# Patient Record
Sex: FEMALE | Race: WHITE | NOT HISPANIC OR LATINO | URBAN - METROPOLITAN AREA
[De-identification: names, ages, dates, MRNs, and addresses within clinical notes are randomized per-mention and may not be internally consistent; named-entity substitution may affect disease eponyms.]

---

## 2021-08-18 ENCOUNTER — LAB OUTSIDE AN ENCOUNTER (OUTPATIENT)
Dept: URBAN - METROPOLITAN AREA CLINIC 94 | Facility: CLINIC | Age: 71
End: 2021-08-18

## 2021-08-18 ENCOUNTER — OFFICE VISIT (OUTPATIENT)
Dept: URBAN - METROPOLITAN AREA CLINIC 94 | Facility: CLINIC | Age: 71
End: 2021-08-18
Payer: MEDICARE

## 2021-08-18 ENCOUNTER — WEB ENCOUNTER (OUTPATIENT)
Dept: URBAN - METROPOLITAN AREA CLINIC 94 | Facility: CLINIC | Age: 71
End: 2021-08-18

## 2021-08-18 VITALS
WEIGHT: 153 LBS | BODY MASS INDEX: 25.49 KG/M2 | SYSTOLIC BLOOD PRESSURE: 141 MMHG | HEART RATE: 84 BPM | DIASTOLIC BLOOD PRESSURE: 79 MMHG | HEIGHT: 65 IN | TEMPERATURE: 97.7 F

## 2021-08-18 DIAGNOSIS — R19.7 DIARRHEA, UNSPECIFIED TYPE: ICD-10-CM

## 2021-08-18 PROCEDURE — 99213 OFFICE O/P EST LOW 20 MIN: CPT | Performed by: INTERNAL MEDICINE

## 2021-08-18 RX ORDER — OMEPRAZOLE 20 MG/1
TAKE 1 TABLET BY ORAL ROUTE AS NEEDED TABLET, DELAYED RELEASE ORAL EVERY OTHER DAY
Refills: 0 | Status: ACTIVE | COMMUNITY
Start: 1900-01-01

## 2021-08-18 RX ORDER — MELATONIN/PYRIDOXINE HCL (B6) 5 MG-10 MG
TAKE 4 CAPSULES BY ORAL ROUTE DAILY TABLET,IMMED, EXTENDED RELEASE, BIPHASIC ORAL 1
Qty: 0 | Refills: 0 | Status: ACTIVE | COMMUNITY
Start: 1900-01-01

## 2021-08-18 RX ORDER — GABAPENTIN 600 MG/1
1 TABLET TABLET, FILM COATED ORAL ONCE A DAY
Refills: 0 | Status: ACTIVE | COMMUNITY
Start: 1900-01-01

## 2021-08-18 RX ORDER — ATORVASTATIN CALCIUM 10 MG/1
TAKE 1 TABLET (10 MG) BY ORAL ROUTE ONCE DAILY TABLET, FILM COATED ORAL 1
Qty: 0 | Refills: 0 | Status: ACTIVE | COMMUNITY
Start: 1900-01-01

## 2021-08-18 RX ORDER — ASPIRIN 81 MG/1
TAKE 1 TABLET (81 MG) BY ORAL ROUTE ONCE DAILY TABLET, COATED ORAL 1
Qty: 0 | Refills: 0 | Status: ACTIVE | COMMUNITY
Start: 1900-01-01

## 2021-08-18 RX ORDER — CALCIUM CARBONATE/VITAMIN D3 600 MG-10
TAKE 1 TABLET BY ORAL ROUTE 2 TIMES A DAY TABLET ORAL 2
Qty: 0 | Refills: 0 | Status: ACTIVE | COMMUNITY
Start: 1900-01-01

## 2021-08-18 RX ORDER — CHOLESTYRAMINE 4 G/9G
1 SCOOP POWDER, FOR SUSPENSION ORAL TWICE A DAY
Qty: 1 | Refills: 3 | OUTPATIENT

## 2021-08-18 RX ORDER — FLUTICASONE PROPIONATE 50 UG/1
INHALE 1 SPRAY BY NASAL ROUTE 2 TIMES A DAY SPRAY, METERED NASAL 2
Qty: 1 | Refills: 0 | Status: ACTIVE | COMMUNITY
Start: 1900-01-01

## 2021-08-18 NOTE — HPI-TODAY'S VISIT:
Pt had colonoscopy for diarrhea in 11/2019 which showed diverticulosis and hemorrhoids. Random colon biopsies were WNL. Pt continues to have diarrhea. Average 3-4 semiliquid to semisolid stools daily. C/O bloating and gas. Denies rectal bleeding or weight loss.

## 2021-08-22 LAB
A/G RATIO: 1.9
ALBUMIN: 5
ALKALINE PHOSPHATASE: 174
ALT (SGPT): 23
AST (SGOT): 20
BASO (ABSOLUTE): 0.1
BASOS: 1
BILIRUBIN, TOTAL: 0.8
BUN/CREATININE RATIO: 22
BUN: 18
C-REACTIVE PROTEIN, QUANT: 6
CALCIUM: 9.9
CARBON DIOXIDE, TOTAL: 24
CHLORIDE: 102
CREATININE: 0.82
EGFR IF AFRICN AM: 84
EGFR IF NONAFRICN AM: 73
ENDOMYSIAL ANTIBODY IGA: NEGATIVE
EOS (ABSOLUTE): 0.2
EOS: 2
F001-IGE EGG WHITE: <0.1
F002-IGE MILK: <0.1
F003-IGE CODFISH: <0.1
F004-IGE WHEAT: <0.1
F008-IGE CORN: <0.1
F010-IGE SESAME SEED: <0.1
F013-IGE PEANUT: <0.1
F014-IGE SOYBEAN: <0.1
F024-IGE SHRIMP: <0.1
F207-IGE CLAM: <0.1
F256-IGE WALNUT: <0.1
F338-IGE SCALLOP: <0.1
GLOBULIN, TOTAL: 2.7
GLUCOSE: 71
HEMATOCRIT: 42.6
HEMATOLOGY COMMENTS:: (no result)
HEMOGLOBIN: 14.1
IMMATURE CELLS: (no result)
IMMATURE GRANS (ABS): 0
IMMATURE GRANULOCYTES: 1
IMMUNOGLOBULIN A, QN, SERUM: 183
LYMPHS (ABSOLUTE): 1.6
LYMPHS: 22
Lab: (no result)
MCH: 28.6
MCHC: 33.1
MCV: 86
MONOCYTES(ABSOLUTE): 0.7
MONOCYTES: 9
NEUTROPHILS (ABSOLUTE): 4.8
NEUTROPHILS: 65
NRBC: (no result)
PLATELETS: 310
POTASSIUM: 4.4
PROTEIN, TOTAL: 7.7
RBC: 4.93
RDW: 13.1
SODIUM: 140
T-TRANSGLUTAMINASE (TTG) IGA: <2
TSH: 2.22
WBC: 7.3

## 2021-08-23 ENCOUNTER — TELEPHONE ENCOUNTER (OUTPATIENT)
Dept: URBAN - METROPOLITAN AREA CLINIC 94 | Facility: CLINIC | Age: 71
End: 2021-08-23

## 2021-08-27 ENCOUNTER — LAB OUTSIDE AN ENCOUNTER (OUTPATIENT)
Dept: URBAN - METROPOLITAN AREA CLINIC 94 | Facility: CLINIC | Age: 71
End: 2021-08-27

## 2021-09-02 LAB
CALPROTECTIN, STOOL - QDX: (no result)
PANCREATICELASTASE ELISA, STOOL: (no result)

## 2021-10-20 ENCOUNTER — LAB OUTSIDE AN ENCOUNTER (OUTPATIENT)
Dept: URBAN - METROPOLITAN AREA CLINIC 94 | Facility: CLINIC | Age: 71
End: 2021-10-20

## 2021-10-20 ENCOUNTER — OFFICE VISIT (OUTPATIENT)
Dept: URBAN - METROPOLITAN AREA CLINIC 94 | Facility: CLINIC | Age: 71
End: 2021-10-20
Payer: MEDICARE

## 2021-10-20 VITALS
SYSTOLIC BLOOD PRESSURE: 134 MMHG | HEART RATE: 87 BPM | HEIGHT: 65 IN | BODY MASS INDEX: 25.49 KG/M2 | TEMPERATURE: 97.9 F | DIASTOLIC BLOOD PRESSURE: 82 MMHG | WEIGHT: 153 LBS

## 2021-10-20 DIAGNOSIS — R14.0 BLOATING: ICD-10-CM

## 2021-10-20 DIAGNOSIS — R19.7 DIARRHEA, UNSPECIFIED TYPE: ICD-10-CM

## 2021-10-20 DIAGNOSIS — R74.8 ELEVATED ALKALINE PHOSPHATASE LEVEL: ICD-10-CM

## 2021-10-20 PROCEDURE — 99214 OFFICE O/P EST MOD 30 MIN: CPT | Performed by: INTERNAL MEDICINE

## 2021-10-20 RX ORDER — FLUTICASONE PROPIONATE 50 UG/1
INHALE 1 SPRAY BY NASAL ROUTE 2 TIMES A DAY SPRAY, METERED NASAL 2
Qty: 1 | Refills: 0 | Status: ACTIVE | COMMUNITY
Start: 1900-01-01

## 2021-10-20 RX ORDER — GABAPENTIN 600 MG/1
1 TABLET TABLET, FILM COATED ORAL ONCE A DAY
Refills: 0 | Status: ACTIVE | COMMUNITY
Start: 1900-01-01

## 2021-10-20 RX ORDER — CHOLESTYRAMINE 4 G/9G
1 SCOOP POWDER, FOR SUSPENSION ORAL TWICE A DAY
Qty: 1 | Refills: 3 | Status: ON HOLD | COMMUNITY

## 2021-10-20 RX ORDER — ASPIRIN 81 MG/1
TAKE 1 TABLET (81 MG) BY ORAL ROUTE ONCE DAILY TABLET, COATED ORAL 1
Qty: 0 | Refills: 0 | Status: ACTIVE | COMMUNITY
Start: 1900-01-01

## 2021-10-20 RX ORDER — CALCIUM CARBONATE/VITAMIN D3 600 MG-10
TAKE 1 TABLET BY ORAL ROUTE 2 TIMES A DAY TABLET ORAL 2
Qty: 0 | Refills: 0 | Status: ACTIVE | COMMUNITY
Start: 1900-01-01

## 2021-10-20 RX ORDER — MELATONIN/PYRIDOXINE HCL (B6) 5 MG-10 MG
TAKE 4 CAPSULES BY ORAL ROUTE DAILY TABLET,IMMED, EXTENDED RELEASE, BIPHASIC ORAL 1
Qty: 0 | Refills: 0 | Status: ACTIVE | COMMUNITY
Start: 1900-01-01

## 2021-10-20 RX ORDER — ATORVASTATIN CALCIUM 10 MG/1
TAKE 1 TABLET (10 MG) BY ORAL ROUTE ONCE DAILY TABLET, FILM COATED ORAL 1
Qty: 0 | Refills: 0 | Status: ACTIVE | COMMUNITY
Start: 1900-01-01

## 2021-10-20 RX ORDER — OMEPRAZOLE 20 MG/1
TAKE 1 TABLET BY ORAL ROUTE AS NEEDED TABLET, DELAYED RELEASE ORAL EVERY OTHER DAY
Refills: 0 | Status: ACTIVE | COMMUNITY
Start: 1900-01-01

## 2021-10-20 NOTE — HPI-TODAY'S VISIT:
Pt recently evaluated for diarrhea Colonoscopy 2019 showed diverticulosis and random colon biopsies were normal. Recent labs showed normal fecal calprotectin and elastase. Bloodwork showed slightly elevated alkphos at 174. Remaining LFTs WNL. CBC, celiac panel, TSH, CRP all WNL Pt c/o excessive gas and bloating all the time. Diarrhea has improved with dietary modifications. Pt states that she has a history of elevated alkaline phosphatase for > 10 years and had extensive w/u for this in [place] which was reporteldy all negative, as per pt.

## 2021-10-25 LAB
A/G RATIO: 1.8
ALBUMIN: 4.8
ALKALINE PHOSPHATASE: 134
ALT (SGPT): 25
AST (SGOT): 18
BILIRUBIN, TOTAL: 0.9
BONE FRACTION:: 27
BUN/CREATININE RATIO: 30
BUN: 24
CALCIUM: 10.2
CARBON DIOXIDE, TOTAL: 23
CHLORIDE: 100
CREATININE: 0.81
EGFR IF AFRICN AM: 85
EGFR IF NONAFRICN AM: 74
GLOBULIN, TOTAL: 2.7
GLUCOSE: 97
INTESTINAL FRAC.:: 4
LIVER FRACTION:: 69
POTASSIUM: 4
PROTEIN, TOTAL: 7.5
SODIUM: 137

## 2021-10-26 ENCOUNTER — TELEPHONE ENCOUNTER (OUTPATIENT)
Dept: URBAN - METROPOLITAN AREA CLINIC 94 | Facility: CLINIC | Age: 71
End: 2021-10-26

## 2021-11-08 ENCOUNTER — TELEPHONE ENCOUNTER (OUTPATIENT)
Dept: URBAN - METROPOLITAN AREA CLINIC 92 | Facility: CLINIC | Age: 71
End: 2021-11-08

## 2021-11-08 ENCOUNTER — TELEPHONE ENCOUNTER (OUTPATIENT)
Dept: URBAN - METROPOLITAN AREA CLINIC 94 | Facility: CLINIC | Age: 71
End: 2021-11-08

## 2021-11-08 RX ORDER — RIFAXIMIN 550 MG/1
1 TABLET TABLET ORAL THREE TIMES A DAY
Qty: 42 TABLET | Refills: 0 | OUTPATIENT
Start: 2021-11-08 | End: 2021-11-22

## 2021-11-08 RX ORDER — RIFAXIMIN 550 MG/1
1 TABLET TABLET ORAL THREE TIMES A DAY
Qty: 42 TABLET | Refills: 0
Start: 2021-11-08 | End: 2021-11-22

## 2022-01-03 ENCOUNTER — LAB OUTSIDE AN ENCOUNTER (OUTPATIENT)
Dept: URBAN - METROPOLITAN AREA CLINIC 94 | Facility: CLINIC | Age: 72
End: 2022-01-03

## 2022-01-03 ENCOUNTER — OFFICE VISIT (OUTPATIENT)
Dept: URBAN - METROPOLITAN AREA CLINIC 94 | Facility: CLINIC | Age: 72
End: 2022-01-03
Payer: MEDICARE

## 2022-01-03 VITALS
HEIGHT: 65 IN | DIASTOLIC BLOOD PRESSURE: 81 MMHG | BODY MASS INDEX: 24.66 KG/M2 | WEIGHT: 148 LBS | SYSTOLIC BLOOD PRESSURE: 143 MMHG | HEART RATE: 79 BPM | TEMPERATURE: 97.3 F

## 2022-01-03 DIAGNOSIS — R14.0 ABDOMINAL BLOATING: ICD-10-CM

## 2022-01-03 DIAGNOSIS — R19.7 DIARRHEA, UNSPECIFIED TYPE: ICD-10-CM

## 2022-01-03 PROCEDURE — 99214 OFFICE O/P EST MOD 30 MIN: CPT | Performed by: INTERNAL MEDICINE

## 2022-01-03 RX ORDER — OMEPRAZOLE 20 MG/1
TAKE 1 TABLET BY ORAL ROUTE AS NEEDED TABLET, DELAYED RELEASE ORAL EVERY OTHER DAY
Refills: 0 | Status: ACTIVE | COMMUNITY
Start: 1900-01-01

## 2022-01-03 RX ORDER — ATORVASTATIN CALCIUM 10 MG/1
TAKE 1 TABLET (10 MG) BY ORAL ROUTE ONCE DAILY TABLET, FILM COATED ORAL 1
Qty: 0 | Refills: 0 | Status: ACTIVE | COMMUNITY
Start: 1900-01-01

## 2022-01-03 RX ORDER — CHOLESTYRAMINE 4 G/9G
1 SCOOP POWDER, FOR SUSPENSION ORAL TWICE A DAY
Qty: 1 | Refills: 3 | Status: ON HOLD | COMMUNITY

## 2022-01-03 RX ORDER — FLUTICASONE PROPIONATE 50 UG/1
INHALE 1 SPRAY BY NASAL ROUTE 2 TIMES A DAY SPRAY, METERED NASAL 2
Qty: 1 | Refills: 0 | Status: ACTIVE | COMMUNITY
Start: 1900-01-01

## 2022-01-03 RX ORDER — GABAPENTIN 600 MG/1
1 TABLET TABLET, FILM COATED ORAL ONCE A DAY
Refills: 0 | Status: ACTIVE | COMMUNITY
Start: 1900-01-01

## 2022-01-03 RX ORDER — DICYCLOMINE HYDROCHLORIDE 10 MG/1
1 TABLET CAPSULE ORAL THREE TIMES A DAY
Qty: 90 TABLET | Refills: 3 | OUTPATIENT
Start: 2022-01-03 | End: 2022-05-03

## 2022-01-03 RX ORDER — MELATONIN/PYRIDOXINE HCL (B6) 5 MG-10 MG
TAKE 4 CAPSULES BY ORAL ROUTE DAILY TABLET,IMMED, EXTENDED RELEASE, BIPHASIC ORAL 1
Qty: 0 | Refills: 0 | Status: ACTIVE | COMMUNITY
Start: 1900-01-01

## 2022-01-03 RX ORDER — CALCIUM CARBONATE/VITAMIN D3 600 MG-10
TAKE 1 TABLET BY ORAL ROUTE 2 TIMES A DAY TABLET ORAL 2
Qty: 0 | Refills: 0 | Status: ACTIVE | COMMUNITY
Start: 1900-01-01

## 2022-01-03 RX ORDER — ASPIRIN 81 MG/1
TAKE 1 TABLET (81 MG) BY ORAL ROUTE ONCE DAILY TABLET, COATED ORAL 1
Qty: 0 | Refills: 0 | Status: ACTIVE | COMMUNITY
Start: 1900-01-01

## 2022-01-03 NOTE — HPI-TODAY'S VISIT:
Pt recently evaluated for bloating, gas and  diarrhea Colonoscopy 2019 showed diverticulosis and random colon biopsies were normal. Recent labs showed normal fecal calprotectin and elastase. Bloodwork showed slightly elevated alkphos at 174. Remaining LFTs WNL. CBC, celiac panel, TSH, CRP all WNL Pt c/o excessive gas and bloating all the time. HBT: POSITIVE FOR SIBO. Pt treated with xifaxan 550 mg TID x 14 days for SIBO. Pt has not noted a significant improvement after taking xifaxan for SIBO. She continues to have bloating and gas with intermittent diarrhea.

## 2022-01-14 ENCOUNTER — ERX REFILL RESPONSE (OUTPATIENT)
Dept: URBAN - METROPOLITAN AREA CLINIC 52 | Facility: CLINIC | Age: 72
End: 2022-01-14

## 2022-01-14 ENCOUNTER — TELEPHONE ENCOUNTER (OUTPATIENT)
Dept: URBAN - METROPOLITAN AREA CLINIC 92 | Facility: CLINIC | Age: 72
End: 2022-01-14

## 2022-01-14 RX ORDER — CIPROFLOXACIN 500 MG/1
1 TABLET TABLET, FILM COATED ORAL
Qty: 20 | OUTPATIENT
Start: 2022-01-14 | End: 2022-01-24

## 2022-01-14 RX ORDER — METRONIDAZOLE 500 MG/1
1 TABLET TABLET, FILM COATED ORAL
Qty: 20 | Refills: 1 | OUTPATIENT

## 2022-01-14 RX ORDER — CIPROFLOXACIN 500 MG/1
1 TABLET TABLET, FILM COATED ORAL
Qty: 20 | OUTPATIENT

## 2022-01-14 RX ORDER — LEVOFLOXACIN 500 MG
1 TABLET TABLET ORAL ONCE A DAY
Qty: 10 | Refills: 0 | OUTPATIENT

## 2022-01-19 ENCOUNTER — TELEPHONE ENCOUNTER (OUTPATIENT)
Dept: URBAN - METROPOLITAN AREA CLINIC 94 | Facility: CLINIC | Age: 72
End: 2022-01-19

## 2022-01-26 ENCOUNTER — OFFICE VISIT (OUTPATIENT)
Dept: URBAN - METROPOLITAN AREA CLINIC 94 | Facility: CLINIC | Age: 72
End: 2022-01-26

## 2022-01-26 ENCOUNTER — ERX REFILL RESPONSE (OUTPATIENT)
Dept: URBAN - METROPOLITAN AREA CLINIC 52 | Facility: CLINIC | Age: 72
End: 2022-01-26

## 2022-01-26 RX ORDER — DICYCLOMINE HYDROCHLORIDE 10 MG/1
TAKE 1 CAPSULE BY MOUTH THREE TIMES A DAY FOR 30 DAYS CAPSULE ORAL
Qty: 90 CAPSULE | Refills: 4 | OUTPATIENT

## 2022-01-26 RX ORDER — DICYCLOMINE HYDROCHLORIDE 10 MG/1
1 TABLET CAPSULE ORAL THREE TIMES A DAY
Qty: 90 TABLET | Refills: 3 | OUTPATIENT

## 2022-03-28 ENCOUNTER — TELEPHONE ENCOUNTER (OUTPATIENT)
Dept: URBAN - METROPOLITAN AREA CLINIC 94 | Facility: CLINIC | Age: 72
End: 2022-03-28

## 2022-03-28 RX ORDER — DICYCLOMINE HYDROCHLORIDE 10 MG/1
TAKE 1 CAPSULE BY MOUTH THREE TIMES A DAY FOR 30 DAYS CAPSULE ORAL
Qty: 90 CAPSULE | Refills: 4

## 2022-04-07 ENCOUNTER — WEB ENCOUNTER (OUTPATIENT)
Dept: URBAN - METROPOLITAN AREA CLINIC 94 | Facility: CLINIC | Age: 72
End: 2022-04-07

## 2022-04-11 ENCOUNTER — OFFICE VISIT (OUTPATIENT)
Dept: URBAN - METROPOLITAN AREA CLINIC 94 | Facility: CLINIC | Age: 72
End: 2022-04-11

## 2022-04-13 ENCOUNTER — OFFICE VISIT (OUTPATIENT)
Dept: URBAN - METROPOLITAN AREA CLINIC 94 | Facility: CLINIC | Age: 72
End: 2022-04-13
Payer: MEDICARE

## 2022-04-13 VITALS
SYSTOLIC BLOOD PRESSURE: 140 MMHG | TEMPERATURE: 97.7 F | DIASTOLIC BLOOD PRESSURE: 79 MMHG | HEIGHT: 65 IN | WEIGHT: 146 LBS | HEART RATE: 82 BPM | BODY MASS INDEX: 24.32 KG/M2

## 2022-04-13 DIAGNOSIS — K63.89 SMALL INTESTINAL BACTERIAL OVERGROWTH (SIBO): ICD-10-CM

## 2022-04-13 DIAGNOSIS — R74.8 ELEVATED ALKALINE PHOSPHATASE LEVEL: ICD-10-CM

## 2022-04-13 PROCEDURE — 99213 OFFICE O/P EST LOW 20 MIN: CPT | Performed by: PHYSICIAN ASSISTANT

## 2022-04-13 RX ORDER — METRONIDAZOLE 500 MG/1
1 TABLET TABLET, FILM COATED ORAL
Qty: 20 | Refills: 1 | Status: ON HOLD | COMMUNITY

## 2022-04-13 RX ORDER — CALCIUM CARBONATE/VITAMIN D3 600 MG-10
TAKE 1 TABLET BY ORAL ROUTE 2 TIMES A DAY TABLET ORAL 2
Qty: 0 | Refills: 0 | Status: ACTIVE | COMMUNITY
Start: 1900-01-01

## 2022-04-13 RX ORDER — MELATONIN/PYRIDOXINE HCL (B6) 5 MG-10 MG
TAKE 4 CAPSULES BY ORAL ROUTE DAILY TABLET,IMMED, EXTENDED RELEASE, BIPHASIC ORAL 1
Qty: 0 | Refills: 0 | Status: ACTIVE | COMMUNITY
Start: 1900-01-01

## 2022-04-13 RX ORDER — CHOLESTYRAMINE 4 G/9G
1 SCOOP POWDER, FOR SUSPENSION ORAL TWICE A DAY
Qty: 1 | Refills: 3 | Status: ON HOLD | COMMUNITY

## 2022-04-13 RX ORDER — ATORVASTATIN CALCIUM 10 MG/1
TAKE 1 TABLET (10 MG) BY ORAL ROUTE ONCE DAILY TABLET, FILM COATED ORAL 1
Qty: 0 | Refills: 0 | Status: ACTIVE | COMMUNITY
Start: 1900-01-01

## 2022-04-13 RX ORDER — LEVOFLOXACIN 500 MG
1 TABLET TABLET ORAL ONCE A DAY
Qty: 10 | Refills: 0 | Status: ON HOLD | COMMUNITY

## 2022-04-13 RX ORDER — OMEPRAZOLE 20 MG/1
TAKE 1 TABLET BY ORAL ROUTE AS NEEDED TABLET, DELAYED RELEASE ORAL EVERY OTHER DAY
Refills: 0 | Status: ON HOLD | COMMUNITY
Start: 1900-01-01

## 2022-04-13 RX ORDER — FLUTICASONE PROPIONATE 50 UG/1
INHALE 1 SPRAY BY NASAL ROUTE 2 TIMES A DAY SPRAY, METERED NASAL 2
Qty: 1 | Refills: 0 | Status: ACTIVE | COMMUNITY
Start: 1900-01-01

## 2022-04-13 RX ORDER — GABAPENTIN 600 MG/1
AS DIRECTED TABLET, FILM COATED ORAL BID
Refills: 0 | Status: ACTIVE | COMMUNITY
Start: 1900-01-01

## 2022-04-13 RX ORDER — DICYCLOMINE HYDROCHLORIDE 10 MG/1
AS DIRECTED CAPSULE ORAL THREE TIMES A DAY
Refills: 4 | Status: ACTIVE | COMMUNITY

## 2022-04-13 RX ORDER — ASPIRIN 81 MG/1
TAKE 1 TABLET (81 MG) BY ORAL ROUTE ONCE DAILY TABLET, COATED ORAL 1
Qty: 0 | Refills: 0 | Status: ON HOLD | COMMUNITY
Start: 1900-01-01

## 2022-04-13 NOTE — HPI-TODAY'S VISIT:
Since her last office visit, patient reports Dicyclomine has really offered improvement. Pt reports soft stools with urgency but better than previous. She denies diarrhea and reports having 1-2 soft stools/day . She denies melena or hematochezia. Pt is aware that sugar seems to be a trigger. She continues on a low fod map diet.    Colonoscopy 2019 showed diverticulosis and random colon biopsies were normal. Recent labs showed normal fecal calprotectin and elastase. Bloodwork showed slightly elevated alkphos at 174. Remaining LFTs WNL. CBC, celiac panel, TSH, CRP all WNL Pt c/o excessive gas and bloating all the time. HBT: POSITIVE FOR SIBO. Pt treated with xifaxan 550 mg TID x 14 days for SIBO. Pt has not noted a significant improvement after taking xifaxan for SIBO. She continues to have bloating and gas with intermittent diarrhea. Pt also tx with Cipro/Flagyl without improvement.

## 2022-07-10 ENCOUNTER — ERX REFILL RESPONSE (OUTPATIENT)
Dept: URBAN - METROPOLITAN AREA CLINIC 52 | Facility: CLINIC | Age: 72
End: 2022-07-10

## 2022-07-10 RX ORDER — DICYCLOMINE HYDROCHLORIDE 10 MG/1
AS DIRECTED CAPSULE ORAL THREE TIMES A DAY
Refills: 4 | OUTPATIENT

## 2022-07-10 RX ORDER — DICYCLOMINE HYDROCHLORIDE 10 MG/1
AS DIRECTED CAPSULE ORAL THREE TIMES A DAY
Qty: 90 | Refills: 4 | OUTPATIENT

## 2022-10-16 ENCOUNTER — ERX REFILL RESPONSE (OUTPATIENT)
Dept: URBAN - METROPOLITAN AREA CLINIC 52 | Facility: CLINIC | Age: 72
End: 2022-10-16

## 2022-10-16 RX ORDER — DICYCLOMINE HYDROCHLORIDE 10 MG/1
AS DIRECTED CAPSULE ORAL THREE TIMES A DAY
Qty: 90 | Refills: 4 | OUTPATIENT

## 2022-10-16 RX ORDER — DICYCLOMINE HYDROCHLORIDE 10 MG/1
TAKE 1 CAPSULE BY MOUTH THREE TIMES A DAY CAPSULE ORAL
Qty: 270 CAPSULE | Refills: 1 | OUTPATIENT

## 2022-10-22 ENCOUNTER — P2P PATIENT RECORD (OUTPATIENT)
Age: 72
End: 2022-10-22

## 2023-02-14 ENCOUNTER — OFFICE VISIT (OUTPATIENT)
Dept: URBAN - METROPOLITAN AREA CLINIC 94 | Facility: CLINIC | Age: 73
End: 2023-02-14
Payer: MEDICARE

## 2023-02-14 VITALS
DIASTOLIC BLOOD PRESSURE: 77 MMHG | TEMPERATURE: 97.7 F | HEIGHT: 65 IN | WEIGHT: 146 LBS | SYSTOLIC BLOOD PRESSURE: 138 MMHG | BODY MASS INDEX: 24.32 KG/M2 | HEART RATE: 84 BPM | OXYGEN SATURATION: 94 %

## 2023-02-14 DIAGNOSIS — K58.0 IRRITABLE BOWEL SYNDROME WITH DIARRHEA: ICD-10-CM

## 2023-02-14 DIAGNOSIS — K63.89 SMALL INTESTINAL BACTERIAL OVERGROWTH (SIBO): ICD-10-CM

## 2023-02-14 PROBLEM — 197125005: Status: ACTIVE | Noted: 2023-02-14

## 2023-02-14 PROCEDURE — 99213 OFFICE O/P EST LOW 20 MIN: CPT | Performed by: PHYSICIAN ASSISTANT

## 2023-02-14 RX ORDER — GABAPENTIN 600 MG/1
AS DIRECTED TABLET, FILM COATED ORAL BID
Refills: 0 | Status: ACTIVE | COMMUNITY
Start: 1900-01-01

## 2023-02-14 RX ORDER — FLUTICASONE PROPIONATE 50 UG/1
INHALE 1 SPRAY BY NASAL ROUTE 2 TIMES A DAY SPRAY, METERED NASAL 2
Qty: 1 | Refills: 0 | Status: ACTIVE | COMMUNITY
Start: 1900-01-01

## 2023-02-14 RX ORDER — DICYCLOMINE HYDROCHLORIDE 10 MG/1
TAKE 1 CAPSULE CAPSULE ORAL THREE TIMES A DAY
Qty: 270 | Refills: 3

## 2023-02-14 RX ORDER — MELATONIN/PYRIDOXINE HCL (B6) 5 MG-10 MG
TAKE 4 CAPSULES BY ORAL ROUTE DAILY TABLET,IMMED, EXTENDED RELEASE, BIPHASIC ORAL 1
Qty: 0 | Refills: 0 | Status: ACTIVE | COMMUNITY
Start: 1900-01-01

## 2023-02-14 RX ORDER — DENOSUMAB 60 MG/ML
AS DIRECTED INJECTION SUBCUTANEOUS
Status: ACTIVE | COMMUNITY

## 2023-02-14 RX ORDER — CALCIUM CARBONATE/VITAMIN D3 600 MG-10
TAKE 1 TABLET BY ORAL ROUTE 2 TIMES A DAY TABLET ORAL 2
Qty: 0 | Refills: 0 | Status: ACTIVE | COMMUNITY
Start: 1900-01-01

## 2023-02-14 RX ORDER — ATORVASTATIN CALCIUM 10 MG/1
TAKE 1 TABLET (10 MG) BY ORAL ROUTE ONCE DAILY TABLET, FILM COATED ORAL 1
Qty: 0 | Refills: 0 | Status: ACTIVE | COMMUNITY
Start: 1900-01-01

## 2023-02-14 RX ORDER — DICYCLOMINE HYDROCHLORIDE 10 MG/1
TAKE 1 CAPSULE BY MOUTH THREE TIMES A DAY CAPSULE ORAL
Qty: 270 CAPSULE | Refills: 1 | Status: ACTIVE | COMMUNITY

## 2023-02-14 NOTE — PHYSICAL EXAM CHEST:
no lesions,  no deformities,  no traumatic injuries,  no significant scars are present,  chest wall non-tender,  no masses present, breathing is unlabored without accessory muscle use,normal breath sounds Regular Diet - No restrictions

## 2023-02-14 NOTE — HPI-TODAY'S VISIT:
73 yo F evaluated today for 1 yr f/u visit for hx of IBS-D.  Since her last office visit, patient reports Dicyclomine has really offered improvement. Pt takes 10 mg TID. She denies abdominal pain and may have diarrhea ~ 6 mos. Pt reports soft stools with urgency but better than previous. She may have 1-2 soft stools/day . She denies melena or hematochezia. Pt is aware that sugar seems to be a trigger. She continues on a low fod map diet and avoids triggers.   Colonoscopy 2019 showed diverticulosis and random colon biopsies were normal. Surveillance colonoscopu in 10 yrs.  Recent labs showed normal fecal calprotectin and elastase. Bloodwork showed slightly elevated alkphos at 174. Remaining LFTs WNL. CBC, celiac panel, TSH, CRP all WNL Pt c/o excessive gas and bloating all the time. HBT: POSITIVE FOR SIBO. Pt treated with xifaxan 550 mg TID x 14 days for SIBO.

## 2023-02-17 ENCOUNTER — TELEPHONE ENCOUNTER (OUTPATIENT)
Dept: URBAN - METROPOLITAN AREA CLINIC 94 | Facility: CLINIC | Age: 73
End: 2023-02-17

## 2023-02-22 ENCOUNTER — TELEPHONE ENCOUNTER (OUTPATIENT)
Dept: URBAN - METROPOLITAN AREA CLINIC 94 | Facility: CLINIC | Age: 73
End: 2023-02-22

## 2023-02-22 RX ORDER — DICYCLOMINE HYDROCHLORIDE 10 MG/1
TAKE 1 CAPSULE CAPSULE ORAL THREE TIMES A DAY
Qty: 270 | Refills: 3

## 2024-02-06 ENCOUNTER — OV EP (OUTPATIENT)
Dept: URBAN - METROPOLITAN AREA CLINIC 94 | Facility: CLINIC | Age: 74
End: 2024-02-06
Payer: MEDICARE

## 2024-02-06 VITALS
OXYGEN SATURATION: 92 % | DIASTOLIC BLOOD PRESSURE: 79 MMHG | BODY MASS INDEX: 24.83 KG/M2 | SYSTOLIC BLOOD PRESSURE: 148 MMHG | HEIGHT: 65 IN | WEIGHT: 149 LBS | TEMPERATURE: 97.5 F | HEART RATE: 93 BPM

## 2024-02-06 DIAGNOSIS — K63.89 SMALL INTESTINAL BACTERIAL OVERGROWTH (SIBO): ICD-10-CM

## 2024-02-06 DIAGNOSIS — K58.0 IRRITABLE BOWEL SYNDROME WITH DIARRHEA: ICD-10-CM

## 2024-02-06 PROCEDURE — 99213 OFFICE O/P EST LOW 20 MIN: CPT | Performed by: PHYSICIAN ASSISTANT

## 2024-02-06 RX ORDER — CALCIUM CARBONATE/VITAMIN D3 600 MG-10
TAKE 1 TABLET BY ORAL ROUTE 2 TIMES A DAY TABLET ORAL 2
Qty: 0 | Refills: 0 | Status: ACTIVE | COMMUNITY
Start: 1900-01-01

## 2024-02-06 RX ORDER — DENOSUMAB 60 MG/ML
AS DIRECTED INJECTION SUBCUTANEOUS
Status: ACTIVE | COMMUNITY

## 2024-02-06 RX ORDER — ATORVASTATIN CALCIUM 10 MG/1
TAKE 1 TABLET (10 MG) BY ORAL ROUTE ONCE DAILY TABLET, FILM COATED ORAL 1
Qty: 0 | Refills: 0 | Status: ACTIVE | COMMUNITY
Start: 1900-01-01

## 2024-02-06 RX ORDER — DICYCLOMINE HYDROCHLORIDE 10 MG/1
TAKE 1 CAPSULE CAPSULE ORAL THREE TIMES A DAY
Qty: 270 | Refills: 3 | Status: ACTIVE | COMMUNITY

## 2024-02-06 RX ORDER — MELATONIN/PYRIDOXINE HCL (B6) 5 MG-10 MG
TAKE 4 CAPSULES BY ORAL ROUTE DAILY TABLET,IMMED, EXTENDED RELEASE, BIPHASIC ORAL 1
Qty: 0 | Refills: 0 | Status: ACTIVE | COMMUNITY
Start: 1900-01-01

## 2024-02-06 RX ORDER — DICYCLOMINE HYDROCHLORIDE 10 MG/1
1 CAPSULE 30 MINUTES BEFORE EATING CAPSULE ORAL THREE TIMES A DAY
Qty: 270 | Refills: 3

## 2024-02-06 RX ORDER — FLUTICASONE PROPIONATE 50 UG/1
INHALE 1 SPRAY BY NASAL ROUTE 2 TIMES A DAY SPRAY, METERED NASAL 2
Qty: 1 | Refills: 0 | Status: ACTIVE | COMMUNITY
Start: 1900-01-01

## 2024-02-06 RX ORDER — GABAPENTIN 600 MG/1
AS DIRECTED TABLET, FILM COATED ORAL BID
Refills: 0 | Status: ACTIVE | COMMUNITY
Start: 1900-01-01

## 2024-02-06 NOTE — HPI-TODAY'S VISIT:
71 yo F evaluated today for 1 yr f/u visit for hx of IBS-D.  Since her last visit, patient continues Dicyclomine 10 mg  TID. She reports diarrhea is nonexistent and may only occur once/mos. She attributes this to diet. Generally tomato products. She does still have hyperactive bowel sounds without pain. Denies unintentional wt loss She denies melena or hematochezia. Pt is aware that sugar seems to be a trigger. She continues on a low fod map diet and avoids triggers.   Colonoscopy 2019 showed diverticulosis and random colon biopsies were normal. Surveillance colonoscopu in 10 yrs.  Recent labs showed normal fecal calprotectin and elastase. CBC, celiac panel, TSH, CRP all WNL Pt c/o excessive gas and bloating all the time. HBT: POSITIVE FOR SIBO. Pt treated with xifaxan 550 mg TID x 14 days for SIBO.

## 2024-02-14 ENCOUNTER — OV EP (OUTPATIENT)
Dept: URBAN - METROPOLITAN AREA CLINIC 94 | Facility: CLINIC | Age: 74
End: 2024-02-14

## 2025-01-30 ENCOUNTER — P2P PATIENT RECORD (OUTPATIENT)
Age: 75
End: 2025-01-30

## 2025-02-02 ENCOUNTER — ERX REFILL RESPONSE (OUTPATIENT)
Dept: URBAN - METROPOLITAN AREA CLINIC 94 | Facility: CLINIC | Age: 75
End: 2025-02-02

## 2025-02-02 RX ORDER — DICYCLOMINE HYDROCHLORIDE 10 MG/1
1 CAPSULE 30 MINUTES BEFORE EATING CAPSULE ORAL THREE TIMES A DAY
Qty: 270 | Refills: 3 | OUTPATIENT

## 2025-02-02 RX ORDER — DICYCLOMINE HYDROCHLORIDE 10 MG/1
1 CAPSULES CAPSULE ORAL THREE TIMES A DAY
Qty: 90 CAPSULE | Refills: 1 | OUTPATIENT

## 2025-02-03 ENCOUNTER — OFFICE VISIT (OUTPATIENT)
Dept: URBAN - METROPOLITAN AREA CLINIC 94 | Facility: CLINIC | Age: 75
End: 2025-02-03

## 2025-02-12 ENCOUNTER — DASHBOARD ENCOUNTERS (OUTPATIENT)
Age: 75
End: 2025-02-12

## 2025-02-12 ENCOUNTER — OFFICE VISIT (OUTPATIENT)
Dept: URBAN - METROPOLITAN AREA CLINIC 94 | Facility: CLINIC | Age: 75
End: 2025-02-12
Payer: MEDICARE

## 2025-02-12 VITALS
BODY MASS INDEX: 24.49 KG/M2 | SYSTOLIC BLOOD PRESSURE: 112 MMHG | HEIGHT: 65 IN | DIASTOLIC BLOOD PRESSURE: 69 MMHG | TEMPERATURE: 98.4 F | WEIGHT: 147 LBS | HEART RATE: 85 BPM | OXYGEN SATURATION: 92 %

## 2025-02-12 DIAGNOSIS — K63.89 SMALL INTESTINAL BACTERIAL OVERGROWTH (SIBO): ICD-10-CM

## 2025-02-12 DIAGNOSIS — K58.0 IRRITABLE BOWEL SYNDROME WITH DIARRHEA: ICD-10-CM

## 2025-02-12 DIAGNOSIS — K21.9 GASTROESOPHAGEAL REFLUX DISEASE, UNSPECIFIED WHETHER ESOPHAGITIS PRESENT: ICD-10-CM

## 2025-02-12 PROBLEM — 235595009: Status: ACTIVE | Noted: 2025-02-12

## 2025-02-12 PROCEDURE — 99214 OFFICE O/P EST MOD 30 MIN: CPT | Performed by: PHYSICIAN ASSISTANT

## 2025-02-12 RX ORDER — GABAPENTIN 600 MG/1
AS DIRECTED TABLET, FILM COATED ORAL BID
Refills: 0 | Status: ACTIVE | COMMUNITY
Start: 1900-01-01

## 2025-02-12 RX ORDER — DICYCLOMINE HYDROCHLORIDE 10 MG/1
1 CAPSULES CAPSULE ORAL THREE TIMES A DAY
Qty: 90 CAPSULE | Refills: 1 | Status: ACTIVE | COMMUNITY

## 2025-02-12 RX ORDER — DENOSUMAB 60 MG/ML
AS DIRECTED INJECTION SUBCUTANEOUS
Status: ACTIVE | COMMUNITY

## 2025-02-12 RX ORDER — DULOXETINE 60 MG/1
1 CAPSULE CAPSULE, DELAYED RELEASE PELLETS ORAL ONCE A DAY
Status: ACTIVE | COMMUNITY

## 2025-02-12 RX ORDER — CALCIUM CARBONATE/VITAMIN D3 600 MG-10
TAKE 1 TABLET BY ORAL ROUTE 2 TIMES A DAY TABLET ORAL 2
Qty: 0 | Refills: 0 | Status: ACTIVE | COMMUNITY
Start: 1900-01-01

## 2025-02-12 RX ORDER — DICYCLOMINE HYDROCHLORIDE 10 MG/1
1 CAPSULE 30 MINUTES BEFORE EATING CAPSULE ORAL THREE TIMES A DAY
Qty: 270 | Refills: 3

## 2025-02-12 RX ORDER — FAMOTIDINE, CALCIUM CARBONATE, AND MAGNESIUM HYDROXIDE 10; 800; 165 MG/1; MG/1; MG/1
1 TABLET AS NEEDED TABLET, CHEWABLE ORAL TWICE A DAY
Status: ACTIVE | COMMUNITY

## 2025-02-12 RX ORDER — KRILL/OM-3/DHA/EPA/PHOSPHO/AST 1000-230MG
1 TABLET CAPSULE ORAL ONCE A DAY
Status: ACTIVE | COMMUNITY

## 2025-02-12 RX ORDER — LOSARTAN POTASSIUM 25 MG/1
1 TABLET TABLET, FILM COATED ORAL ONCE A DAY
Status: ACTIVE | COMMUNITY

## 2025-02-12 RX ORDER — FLUTICASONE PROPIONATE 50 UG/1
INHALE 1 SPRAY BY NASAL ROUTE 2 TIMES A DAY SPRAY, METERED NASAL 2
Qty: 1 | Refills: 0 | Status: ACTIVE | COMMUNITY
Start: 1900-01-01

## 2025-02-12 RX ORDER — MELATONIN/PYRIDOXINE HCL (B6) 5 MG-10 MG
TAKE 4 CAPSULES BY ORAL ROUTE DAILY TABLET,IMMED, EXTENDED RELEASE, BIPHASIC ORAL 1
Qty: 0 | Refills: 0 | Status: ACTIVE | COMMUNITY
Start: 1900-01-01

## 2025-02-12 RX ORDER — ATORVASTATIN CALCIUM 10 MG/1
TAKE 1 TABLET (10 MG) BY ORAL ROUTE ONCE DAILY TABLET, FILM COATED ORAL 1
Qty: 0 | Refills: 0 | Status: ACTIVE | COMMUNITY
Start: 1900-01-01